# Patient Record
Sex: MALE | Race: WHITE | NOT HISPANIC OR LATINO | URBAN - METROPOLITAN AREA
[De-identification: names, ages, dates, MRNs, and addresses within clinical notes are randomized per-mention and may not be internally consistent; named-entity substitution may affect disease eponyms.]

---

## 2017-01-01 ENCOUNTER — OFFICE VISIT - HEALTHEAST (OUTPATIENT)
Dept: GERIATRICS | Facility: CLINIC | Age: 72
End: 2017-01-01

## 2017-01-01 ENCOUNTER — AMBULATORY - HEALTHEAST (OUTPATIENT)
Dept: GERIATRICS | Facility: CLINIC | Age: 72
End: 2017-01-01

## 2017-01-01 DIAGNOSIS — R54 SENILE DEBILITY: ICD-10-CM

## 2017-01-01 DIAGNOSIS — R26.81 GAIT INSTABILITY: ICD-10-CM

## 2017-01-01 DIAGNOSIS — G30.9 ALZHEIMER'S DISEASE (H): ICD-10-CM

## 2017-01-01 DIAGNOSIS — R29.6 FREQUENT FALLS: ICD-10-CM

## 2017-01-01 DIAGNOSIS — F09 ORGANIC BRAIN SYNDROME: ICD-10-CM

## 2017-01-01 DIAGNOSIS — F02.80 ALZHEIMER'S DISEASE (H): ICD-10-CM

## 2017-01-01 DIAGNOSIS — R62.7 FTT (FAILURE TO THRIVE) IN ADULT: ICD-10-CM

## 2017-01-01 DIAGNOSIS — G47.00 INSOMNIA: ICD-10-CM

## 2021-05-30 VITALS — WEIGHT: 146 LBS

## 2021-06-08 NOTE — PROGRESS NOTES
[facility]    Facility:   [facility] [[id number]]   Code Status: DNR/DNI      CHIEF COMPLAINT/REASON FOR VISIT:  Chief Complaint   Patient presents with     Review Of Multiple Medical Conditions       HISTORY:      HPI: Chris is a 71 y.o. male Who is been placed on hospice because of slow and steady decline in mental function, ability to do activities of daily living, and frequent falls. His ability to walk is deteriorating in his need for rest and sleep has increased significantly. Happily there have been no recent issues of behavioral altercations.    Past Medical History   Diagnosis Date     Alcoholism      Bober for 28 years     Alzheimer's disease      Astigmatism      Cancer      Cataracts, bilateral      Did not specify if bilateral or not.     Closed fibular fracture      Constipation      Erectile dysfunction      Frequent falls      H/O prostate cancer      Hyperlipidemia      Hyperopia      Hypertension      Insomnia      Melanoma      Of the skin     Organic brain syndrome      Prostate cancer      Ptosis      Unspecified     Urethral stricture      Remote             Family History   Problem Relation Age of Onset     Dementia Mother      Alzheimer's disease     Alcohol abuse Father      Social History     Social History     Marital status:      Spouse name: N/A     Number of children: N/A     Years of education: N/A     Social History Main Topics     Smoking status: Not on file     Smokeless tobacco: Not on file     Alcohol use Yes      Comment: History of Alcoholism. Ashwin x 28 years     Drug use: Not on file     Sexual activity: Not on file     Other Topics Concern     Not on file     Social History Narrative     No narrative on file         Review of Systems    .  Vitals:    01/06/17 1444   BP: 121/68   Pulse: 78   Weight: 146 lb (66.2 kg)       Physical Exam   Constitutional:   His weight has dropped recently but has also yo-yo to at times. He appears  thinner than prior. He still makes his unique type of irreverent comments but he seems to track information much more poorly. He does not appear angry. Lungs are clear hard his regular rate he does not have significant  muscle wasting his lower extremity   Vitals reviewed.        LABS:       ASSESSMENT:      ICD-10-CM    1. Gait instability R26.81    2. Senile debility R54    3. Organic brain syndrome F09        PLAN:    Reviewing his medicines he is only on aspirin, Claritin, melatonin, MiraLAX with Tylenol extra strength and Anusol suppositories as needed. These are good protective regimens for him and I would not intervene except for episodic concerns    Total 15 minutes of which 65% was spent counseling and coordination of care of the above plan.    Electronically signed by: Joseph Hopper MD

## 2021-06-08 NOTE — PROGRESS NOTES
[facility]    Facility:   [facility] [[id number]]   Code Status: DNR      CHIEF COMPLAINT/REASON FOR VISIT:  Chief Complaint   Patient presents with     Review Of Multiple Medical Conditions       HISTORY:      HPI: Chris is a 71 y.o. male Who I had a chance to visit with secondary to monthly review of chronic medical conditions. Currently on hospice care. He is getting extra three snacks per day for nutritional supplement. He does try to keep active in does try to be independent he does have a history of falls and has taken a few noninjury type of falls. He has been normotensive and afebrile. His weight 145 pounds which is consistent with his previous weight from January and December. He does look thin. Currently not having any sleeping issues he does sleep throughout the day but also at night and so we will discontinues melatonin. Does not appear to be in any pain and will decrease this Tylenol 2000 mg twice a day rather than TID. Staff do a pretty good job of making sure that they do attend was needs and try to redirect him.    Past Medical History:   Diagnosis Date     Alcoholism     Bober for 28 years     Alzheimer's disease      Astigmatism      Cancer      Cataracts, bilateral     Did not specify if bilateral or not.     Closed fibular fracture      Constipation      Erectile dysfunction      Frequent falls      H/O prostate cancer      Hyperlipidemia      Hyperopia      Hypertension      Insomnia      Melanoma     Of the skin     Organic brain syndrome      Prostate cancer      Ptosis     Unspecified     Urethral stricture     Remote             Family History   Problem Relation Age of Onset     Dementia Mother      Alzheimer's disease     Alcohol abuse Father      Social History     Social History     Marital status:      Spouse name: N/A     Number of children: N/A     Years of education: N/A     Social History Main Topics     Smoking status: Not on file      Smokeless tobacco: Not on file     Alcohol use Yes      Comment: History of Alcoholism. Ashwin everett 28 years     Drug use: Not on file     Sexual activity: Not on file     Other Topics Concern     Not on file     Social History Narrative     No narrative on file         Review of Systems  No reports of fever chills fatigue cough or cold or flu like symptoms nausea vomiting diarrhea dysuria. Does have chronic back pain recently Tylenol was given. Otherwise no rashes sores pruritus or mood changes    /  Current Outpatient Prescriptions:      acetaminophen (TYLENOL) 650 MG CR tablet, Take 650 mg by mouth 2 (two) times a day., Disp: , Rfl:      aspirin 81 MG EC tablet, Take 81 mg by mouth once daily. for cardiac, Disp: , Rfl:      loratadine (CLARITIN) 10 mg tablet, Take 10 mg by mouth once daily. for allergies, Disp: , Rfl:      polyethylene glycol (MIRALAX) 17 gram packet, Take 17 g by mouth 2 (two) times a day as needed. For constipation, Disp: , Rfl:     .  Vitals:    02/08/17 1000   BP: 103/69   Pulse: 72   Resp: 20   Temp: 98.9  F (37.2  C)       Physical Exam  Constitutional: He appears well-developed and well-nourished.    HENT:    Head: Normocephalic.    Nose: Nose normal.    Eyes: Pupils are equal, round, and reactive to light.   Glasses.    Neck: Neck supple. No thyromegaly present.    Cardiovascular: Normal rate, regular rhythm and normal heart sounds.    Pulmonary/Chest: Breath sounds normal.    Abdominal: Bowel sounds are normal. There is no tenderness. There is no guarding.   Musculoskeletal:   Independent. Ambulatory-hx of gait instability. No pain.   Lymphadenopathy:   He has no cervical adenopathy.   Neurological: He is alert.    Skin: Skin is warm and dry. No rash noted.   Psychiatric: He has a normal mood and affect. His behavior is normal.       LABS:   No results found for this or any previous visit.  Lab Results   Component Value Date    WBC 8.6 10/12/2016    HGB 11.6 (L) 10/12/2016    HCT 34.6 (L)  10/12/2016    MCV 91 10/12/2016     10/12/2016         ASSESSMENT:      ICD-10-CM    1. Alzheimer's disease G30.9    2. Frequent falls R29.6    3. Insomnia G47.00    4. Gait instability R26.81        PLAN:    At this point continue to manage and follow. Staff are doing a pretty good job of redirecting him. Discontinue the melatonin and decrease the Tylenol. Hospice care.      Electronically signed by: Michael Duane Johnson, CNP

## 2021-06-09 NOTE — PROGRESS NOTES
[facility]    Facility:   [facility] [[id number]]   Code Status: DNR/DNI      CHIEF COMPLAINT/REASON FOR VISIT:  Chief Complaint   Patient presents with     Review Of Multiple Medical Conditions       HISTORY:      HPI: Chris is a 71 y.o. male Who I had a chance to visit with today secondary to monthly review of chronic medical conditions. Had a chance to talk with the hospice nurse and the charge nurse today along with the floor nurse. He has had periods of agitation irritation. How about a week or so ago in talking with hospice they were going to go ahead and schedule the oxycodone every four hours he does have Haldol is PRN and they are giving him a few doses per day. Over the week any did have another fall but it's really not a fall it's just that he rules out a bed. No recent injury. A couple weeks ago he did have some x-rays withdrawal negative for injury. The other day he did have a good day and actually ate some food and showed some independence while eating food at the table otherwise he has been afebrile and also on room air. Very difficult to form any form of a conversation with him today. He also is refusing many medications including the PRN medications so at that point we will also now discontinue the aspirin and the Claritin since the staff is spending a lot of time trying to convince him to take anything whatsoever    Past Medical History:   Diagnosis Date     Alcoholism     Bober for 28 years     Alzheimer's disease      Astigmatism      Cancer      Cataracts, bilateral     Did not specify if bilateral or not.     Closed fibular fracture      Constipation      Erectile dysfunction      Frequent falls      H/O prostate cancer      Hyperlipidemia      Hyperopia      Hypertension      Insomnia      Melanoma     Of the skin     Organic brain syndrome      Prostate cancer      Ptosis     Unspecified     Urethral stricture     Remote             Family History    Problem Relation Age of Onset     Dementia Mother      Alzheimer's disease     Alcohol abuse Father      Social History     Social History     Marital status:      Spouse name: N/A     Number of children: N/A     Years of education: N/A     Social History Main Topics     Smoking status: Not on file     Smokeless tobacco: Not on file     Alcohol use Yes      Comment: History of Alcoholism. Ashwin everett 28 years     Drug use: Not on file     Sexual activity: Not on file     Other Topics Concern     Not on file     Social History Narrative     No narrative on file         Review of Systems  No reports of fever chills fatigue cough or cold or flu like symptoms nausea vomiting diarrhea dysuria. Does have chronic back pain recently Tylenol was given. Otherwise no rashes sores pruritus or mood changes         Current Outpatient Prescriptions:      acetaminophen (TYLENOL) 650 MG CR tablet, Take 650 mg by mouth 2 (two) times a day., Disp: , Rfl:      polyethylene glycol (MIRALAX) 17 gram packet, Take 17 g by mouth 2 (two) times a day as needed. For constipation, Disp: , Rfl:     .  Vitals:    03/01/17 1018   BP: 122/70   Pulse: 74   Resp: 18   Temp: 97.2  F (36.2  C)       Physical Exam   Constitutional: He appears well-developed and well-nourished.    HENT:    Head: Normocephalic.    Nose: Nose normal.    Eyes: Pupils are equal, round, and reactive to light.   Glasses.    Neck: Neck supple. No thyromegaly present.    Cardiovascular: Normal rate, regular rhythm and normal heart sounds.    Pulmonary/Chest: Breath sounds normal.    Abdominal: Bowel sounds are normal. There is no tenderness. There is no guarding.   Musculoskeletal:   Gait instab. Hx of falls.   Lymphadenopathy:   He has no cervical adenopathy.     LABS:   No results found for this or any previous visit.      Lab Results   Component Value Date    WBC 8.6 10/12/2016    HGB 11.6 (L) 10/12/2016    HCT 34.6 (L) 10/12/2016    MCV 91 10/12/2016      10/12/2016         ASSESSMENT:      ICD-10-CM    1. FTT (failure to thrive) in adult R62.7    2. Gait instability R26.81    3. Frequent falls R29.6    4. Organic brain syndrome F09        PLAN:    Discontinue the aspirin on the Claritin. Otherwise they do have some oxycodone and Haldol that the continue to give him. He has periods of agitation. Periods of restlessness. Also impulsiveness. I did have a chance to again address this with the hospice nurse.      Electronically signed by: Michael Duane Johnson, CNP